# Patient Record
Sex: MALE | Race: WHITE | Employment: STUDENT | URBAN - METROPOLITAN AREA
[De-identification: names, ages, dates, MRNs, and addresses within clinical notes are randomized per-mention and may not be internally consistent; named-entity substitution may affect disease eponyms.]

---

## 2021-10-08 ENCOUNTER — TELEPHONE (OUTPATIENT)
Dept: OTHER | Age: 26
End: 2021-10-08

## 2021-10-08 ENCOUNTER — HOSPITAL ENCOUNTER (OUTPATIENT)
Age: 26
Discharge: HOME | End: 2021-10-08
Payer: COMMERCIAL

## 2021-10-08 VITALS
BODY MASS INDEX: 21.25 KG/M2 | HEART RATE: 77 BPM | OXYGEN SATURATION: 100 % | WEIGHT: 180 LBS | RESPIRATION RATE: 14 BRPM | HEIGHT: 77 IN | TEMPERATURE: 98 F | DIASTOLIC BLOOD PRESSURE: 67 MMHG | SYSTOLIC BLOOD PRESSURE: 139 MMHG

## 2021-10-08 DIAGNOSIS — J01.90 ACUTE SINUSITIS, RECURRENCE NOT SPECIFIED, UNSPECIFIED LOCATION: ICD-10-CM

## 2021-10-08 DIAGNOSIS — R42 VERTIGO: Primary | ICD-10-CM

## 2021-10-08 PROCEDURE — 80047 BASIC METABLC PNL IONIZED CA: CPT | Performed by: NURSE PRACTITIONER

## 2021-10-08 PROCEDURE — U0002 COVID-19 LAB TEST NON-CDC: HCPCS | Performed by: NURSE PRACTITIONER

## 2021-10-08 PROCEDURE — 96361 HYDRATE IV INFUSION ADD-ON: CPT | Performed by: NURSE PRACTITIONER

## 2021-10-08 PROCEDURE — 99214 OFFICE O/P EST MOD 30 MIN: CPT | Performed by: NURSE PRACTITIONER

## 2021-10-08 PROCEDURE — 85025 COMPLETE CBC W/AUTO DIFF WBC: CPT | Performed by: NURSE PRACTITIONER

## 2021-10-08 PROCEDURE — 96374 THER/PROPH/DIAG INJ IV PUSH: CPT | Performed by: NURSE PRACTITIONER

## 2021-10-08 RX ORDER — MECLIZINE HYDROCHLORIDE 25 MG/1
25 TABLET ORAL 3 TIMES DAILY PRN
Qty: 20 TABLET | Refills: 0 | Status: SHIPPED | OUTPATIENT
Start: 2021-10-08

## 2021-10-08 RX ORDER — ONDANSETRON 4 MG/1
4 TABLET, ORALLY DISINTEGRATING ORAL EVERY 4 HOURS PRN
Qty: 10 TABLET | Refills: 0 | Status: SHIPPED | OUTPATIENT
Start: 2021-10-08 | End: 2021-10-15

## 2021-10-08 RX ORDER — SODIUM CHLORIDE 9 MG/ML
1000 INJECTION, SOLUTION INTRAVENOUS ONCE
Status: COMPLETED | OUTPATIENT
Start: 2021-10-08 | End: 2021-10-08

## 2021-10-08 RX ORDER — MECLIZINE HYDROCHLORIDE 25 MG/1
25 TABLET ORAL ONCE
Status: COMPLETED | OUTPATIENT
Start: 2021-10-08 | End: 2021-10-08

## 2021-10-08 RX ORDER — ONDANSETRON 2 MG/ML
4 INJECTION INTRAMUSCULAR; INTRAVENOUS ONCE
Status: COMPLETED | OUTPATIENT
Start: 2021-10-08 | End: 2021-10-08

## 2021-10-08 NOTE — ED INITIAL ASSESSMENT (HPI)
Pt presents to the IC with c/o dizziness and nausea after attending a concert this past weekend. Pt was seen at a walk in clinic and put on amox for a possible sinus infection. Pt isn't feeling better.

## 2021-10-08 NOTE — TELEPHONE ENCOUNTER
Reason for Call/Symptoms: dizziness  Onset: Tuesday, worsening today at work. Patient was seen at [facility] Tuesday for \"raging sinus infection\" and given abx. Mom picked him up at work today because he was afraid to drive.   Courtesy Assessment: JORGE

## 2021-10-08 NOTE — ED QUICK NOTES
Pt notes the nausea has improved as well as the dizziness. Pt reports a very mild sinus headache rated 0.5/10.

## 2021-10-09 NOTE — ED PROVIDER NOTES
Patient presents with:  Dizziness  Nausea      HPI:     Jennifer Culver is a 26year old male who presents for dizziness. He states he has had intermittent episodes of vertigo since going to a concert over the weekend. That was 6 days ago.   He states he   Lack of Transportation (Medical): Not on file      Lack of Transportation (Non-Medical):  Not on file  Physical Activity:       Days of Exercise per Week: Not on file      Minutes of Exercise per Session: Not on file  Stress:       Feeling of Stress : Not patent  LUNGS: clear to auscultation bilaterally; no rales, rhonchi, or wheezes   NEURO: No focal deficits. PSYCH: Alert and oriented x3. Answering questions appropriately. Mood appropriate.     MDM/Assessment/Plan:   Orders for this encounter:  Orders - 18 mg/dL    [facility]AT Potassium 3.9 3.6 - 5.1 mmol/L    [facility]AT Chloride 102 98 - 112 mmol/L    [facility]AT Ionized Calcium 1.25 1.12 - 1.32 mmol/L    ISTAT Hematocrit 50 37 - 53 %    ISTAT Glucose 99 70 - 99 mg/dL    ISTAT TCO2 28 21 - 32 mmol/L    ISTAT Creatinine